# Patient Record
Sex: MALE | Race: WHITE
[De-identification: names, ages, dates, MRNs, and addresses within clinical notes are randomized per-mention and may not be internally consistent; named-entity substitution may affect disease eponyms.]

---

## 2018-08-30 ENCOUNTER — HOSPITAL ENCOUNTER (EMERGENCY)
Dept: HOSPITAL 88 - ER | Age: 83
Discharge: NURSING, REHAB OR LONG-TERM CARE | End: 2018-08-30
Payer: MEDICARE

## 2018-08-30 VITALS — WEIGHT: 145 LBS | HEIGHT: 70 IN | BODY MASS INDEX: 20.76 KG/M2

## 2018-08-30 DIAGNOSIS — I48.91: ICD-10-CM
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
## 2018-08-30 DIAGNOSIS — S00.83XA: Primary | ICD-10-CM

## 2018-08-30 DIAGNOSIS — I25.10: ICD-10-CM

## 2018-08-30 DIAGNOSIS — K21.9: ICD-10-CM

## 2018-08-30 DIAGNOSIS — F03.90: ICD-10-CM

## 2018-08-30 DIAGNOSIS — F32.9: ICD-10-CM

## 2018-08-30 LAB
ALBUMIN SERPL-MCNC: 3.4 G/DL (ref 3.5–5)
ALBUMIN/GLOB SERPL: 0.8 {RATIO} (ref 0.8–2)
ALP SERPL-CCNC: 58 IU/L (ref 40–150)
ALT SERPL-CCNC: 20 IU/L (ref 0–55)
ANION GAP SERPL CALC-SCNC: 14.8 MMOL/L (ref 8–16)
BASOPHILS # BLD AUTO: 0 10*3/UL (ref 0–0.1)
BASOPHILS NFR BLD AUTO: 0.8 % (ref 0–1)
BUN SERPL-MCNC: 19 MG/DL (ref 7–26)
BUN/CREAT SERPL: 21 (ref 6–25)
CALCIUM SERPL-MCNC: 9.2 MG/DL (ref 8.4–10.2)
CHLORIDE SERPL-SCNC: 106 MMOL/L (ref 98–107)
CK MB SERPL-MCNC: 4.7 NG/ML (ref 0–5)
CK SERPL-CCNC: 149 IU/L (ref 30–200)
CO2 SERPL-SCNC: 27 MMOL/L (ref 22–29)
DEPRECATED APTT PLAS QN: 41.3 SECONDS (ref 23.8–35.5)
DEPRECATED INR PLAS: 3.53
DEPRECATED NEUTROPHILS # BLD AUTO: 2.6 10*3/UL (ref 2.1–6.9)
EGFRCR SERPLBLD CKD-EPI 2021: > 60 ML/MIN (ref 60–?)
EOSINOPHIL # BLD AUTO: 0.1 10*3/UL (ref 0–0.4)
EOSINOPHIL NFR BLD AUTO: 2.3 % (ref 0–6)
ERYTHROCYTE [DISTWIDTH] IN CORD BLOOD: 14.9 % (ref 11.7–14.4)
GLOBULIN PLAS-MCNC: 4.1 G/DL (ref 2.3–3.5)
GLUCOSE SERPLBLD-MCNC: 88 MG/DL (ref 74–118)
HCT VFR BLD AUTO: 37.4 % (ref 38.2–49.6)
HGB BLD-MCNC: 12.4 G/DL (ref 14–18)
LYMPHOCYTES # BLD: 1.5 10*3/UL (ref 1–3.2)
LYMPHOCYTES NFR BLD AUTO: 30.7 % (ref 18–39.1)
MCH RBC QN AUTO: 32.7 PG (ref 28–32)
MCHC RBC AUTO-ENTMCNC: 33.2 G/DL (ref 31–35)
MCV RBC AUTO: 98.7 FL (ref 81–99)
MONOCYTES # BLD AUTO: 0.5 10*3/UL (ref 0.2–0.8)
MONOCYTES NFR BLD AUTO: 10.7 % (ref 4.4–11.3)
NEUTS SEG NFR BLD AUTO: 55.1 % (ref 38.7–80)
PLATELET # BLD AUTO: 205 X10E3/UL (ref 140–360)
POTASSIUM SERPL-SCNC: 4.8 MMOL/L (ref 3.5–5.1)
PROTHROMBIN TIME: 33.2 SECONDS (ref 11.9–14.5)
RBC # BLD AUTO: 3.79 X10E6/UL (ref 4.3–5.7)
SODIUM SERPL-SCNC: 143 MMOL/L (ref 136–145)

## 2018-08-30 PROCEDURE — 71046 X-RAY EXAM CHEST 2 VIEWS: CPT

## 2018-08-30 PROCEDURE — 85730 THROMBOPLASTIN TIME PARTIAL: CPT

## 2018-08-30 PROCEDURE — 70450 CT HEAD/BRAIN W/O DYE: CPT

## 2018-08-30 PROCEDURE — 93005 ELECTROCARDIOGRAM TRACING: CPT

## 2018-08-30 PROCEDURE — 85610 PROTHROMBIN TIME: CPT

## 2018-08-30 PROCEDURE — 84484 ASSAY OF TROPONIN QUANT: CPT

## 2018-08-30 PROCEDURE — 82550 ASSAY OF CK (CPK): CPT

## 2018-08-30 PROCEDURE — 85025 COMPLETE CBC W/AUTO DIFF WBC: CPT

## 2018-08-30 PROCEDURE — 82948 REAGENT STRIP/BLOOD GLUCOSE: CPT

## 2018-08-30 PROCEDURE — 72125 CT NECK SPINE W/O DYE: CPT

## 2018-08-30 PROCEDURE — 80053 COMPREHEN METABOLIC PANEL: CPT

## 2018-08-30 PROCEDURE — 99284 EMERGENCY DEPT VISIT MOD MDM: CPT

## 2018-08-30 PROCEDURE — 82553 CREATINE MB FRACTION: CPT

## 2018-08-30 PROCEDURE — 36415 COLL VENOUS BLD VENIPUNCTURE: CPT

## 2018-08-30 NOTE — DIAGNOSTIC IMAGING REPORT
EXAM: CHEST 2 VIEWS, PA and lateral

INDICATION: Fall, left hip pain

COMPARISON: None



FINDINGS:

LINES/TUBES: None



LUNGS: No consolidations or edema. 



PLEURA: No effusions or pneumothorax.



HEART AND MEDIASTINUM: Normal size and contour.



BONES AND SOFT TISSUES: No acute findings. 



IMPRESSION:

No acute thoracic abnormality.







Signed by: Dr. Dee Anguiano M.D. on 8/30/2018 4:40 AM

## 2018-08-30 NOTE — DIAGNOSTIC IMAGING REPORT
EXAM: HIP LEFT 2-3 VW (+/- PELVIS)

INDICATION: Fall, left hip pain

COMPARISON: None



FINDINGS:

BONES: 

Questionable cortical break of the left femoral neck seen on one view.



JOINTS:

No malalignment.



SOFT TISSUES:

Normal



IMPRESSION:

Questionable cortical break of the left femoral neck. 

If pain persists, consider follow-up MRI.





Signed by: Dr. Dee Anguiano M.D. on 8/30/2018 4:40 AM

## 2018-08-30 NOTE — DIAGNOSTIC IMAGING REPORT
EXAMINATION: Head CT without contrast.  





HISTORY:Status post fall.



COMPARISON:None.

TECHNIQUE: Multidetector axial images were obtained from the foramen magnum to

the vertex without contrast. The images were reconstructed using brain and bone

algorithms.  Thin section brain images were reformatted into coronal and

sagittal planes.

Dose modulation, iterative reconstruction, and/or weight based adjustment of

the mA/kV was utilized to reduce the radiation dose to as low as reasonably

achievable.



Intravenous contrast: None  



IMAGE QUALITY: Acceptable.



FINDINGS:

    Skull/scalp: Expected postoperative changes from prior left frontal and

parietal elvie hole/craniotomy.

    Parenchyma: Cortical-based hypodensity in right occipital lobe represents

age indeterminate vascular insult. Nonspecific bilateral frontoparietal patchy

white matter hypodensity are likely related to small vessel ischemic changes.

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

No acute hemorrhage, mass or acute major vascular territorial infarct.

    Arteries: Atherosclerotic calcification in bilateral carotid siphon and V4

segment of the vertebral arteries.

    Dural sinuses: No abnormal density suggestive of thrombosis. 

    Ventricles: Moderate compensated dilatation due to volume loss. No

hydrocephalus.

    Extra-axial spaces: Asymmetric mild left frontoparietal prominent

extra-axial space, approximately 7 mm in maximum thickness may be related to

volume loss or secondary to underlying chronic subdural hygroma/hematoma. No

significant mass effect or midline shift.

    Brain volume: Generalized age-related cerebral volume loss.

    Craniocervical junction: No mass, Chiari malformation, or basilar

invagination.

    Sella: No mass. 

    Paranasal/mastoid sinuses: Imaged portions unremarkable.





IMPRESSION:

1. Age indeterminate, possible chronic vascular insult in right occipital lobe

in PCA vascular territory.



2. Mild supratentorial white matter microvascular ischemic changes.



3. Generalized age-related cerebral volume loss.



4. Expected postoperative changes from prior left frontoparietal

craniotomy/elvie hole.



5. Asymmetric prominent left frontoparietal extra-axial space may be related to

volume loss or secondary to chronic subdural hygroma/hematoma without

significant midline shift of regional mass effect.



Signed by: Dr. Melissa Garcia M.D. on 8/30/2018 4:27 AM

## 2018-08-30 NOTE — DIAGNOSTIC IMAGING REPORT
History: Status post fall.



Comparison studies: None



Technique: 

Axial images were obtained through the cervical region..

Coronal and sagittal images reconstructed from the axial data.

Dose modulation, iterative reconstruction, and/or weight based adjustment of

the mA/kV was utilized to reduce the radiation dose to as low as reasonably

achievable.



Intravenous contrast: None



Findings:



Fractures: None.

Soft tissue injuries: None.



Atlantoaxial articulation: Intact.

Alignment: Normal lordosis. No scoliosis. 2.5 mm grade 1 anterolisthesis at

level C6-C7 and 1.5 mm grade 1 anterolisthesis at C7-T1 are likely

degenerative.

Cervicomedullary junction: No abnormalities. The foramen magnum is patent.

Soft tissues: No abnormalities. 



Vertebrae: 

No fractures, infection or neoplasm.



Degenerative changes: 

C2-C3: Mild left foraminal stenosis due to facet and uncovertebral arthrosis.

C3-C4: Posterior disc osteophyte complex without canal stenosis.

Mild right and moderate left foraminal stenosis due to facet and uncovertebral

arthrosis.

C4-C5: Mild degenerative disc disease.

Severe right and mild left foraminal stenosis due to facet and uncovertebral

arthrosis.

C5-C6: Moderate degenerative disc disease.

Posterior disc osteophyte complex results in mild canal stenosis.

Severe right and moderate left foraminal stenosis due to facet and

uncovertebral arthrosis.

C6-C7: Moderate degenerative disc disease.

Posterior disc osteophyte complex results in mild canal stenosis.

Mild right foraminal stenosis due to facet and uncovertebral arthrosis.



IMPRESSION:



1.  No acute cervical spine fracture or dislocation. Grade 1 anterolisthesis at

C6-C7 and C7-T1 are likely degenerative.



2.  Ligament, spinal cord and or vascular abnormalities cannot be excluded on

the basis of this examination.



3.  Cervical spondylosis as detailed above.







Signed by: Dr. Melissa Garcia M.D. on 8/30/2018 4:32 AM